# Patient Record
Sex: FEMALE | Race: ASIAN | NOT HISPANIC OR LATINO | URBAN - METROPOLITAN AREA
[De-identification: names, ages, dates, MRNs, and addresses within clinical notes are randomized per-mention and may not be internally consistent; named-entity substitution may affect disease eponyms.]

---

## 2018-03-25 ENCOUNTER — EMERGENCY (EMERGENCY)
Facility: HOSPITAL | Age: 28
LOS: 1 days | Discharge: HOME | End: 2018-03-25
Attending: EMERGENCY MEDICINE
Payer: COMMERCIAL

## 2018-03-25 VITALS
DIASTOLIC BLOOD PRESSURE: 83 MMHG | SYSTOLIC BLOOD PRESSURE: 122 MMHG | WEIGHT: 130.07 LBS | OXYGEN SATURATION: 100 % | RESPIRATION RATE: 16 BRPM | TEMPERATURE: 98 F | HEART RATE: 75 BPM

## 2018-03-25 PROCEDURE — 99283 EMERGENCY DEPT VISIT LOW MDM: CPT

## 2018-03-25 RX ORDER — DIPHENHYDRAMINE HCL 50 MG
25 CAPSULE ORAL ONCE
Qty: 0 | Refills: 0 | Status: COMPLETED | OUTPATIENT
Start: 2018-03-25 | End: 2018-03-25

## 2018-03-25 RX ORDER — IBUPROFEN 200 MG
400 TABLET ORAL ONCE
Qty: 0 | Refills: 0 | Status: COMPLETED | OUTPATIENT
Start: 2018-03-25 | End: 2018-03-25

## 2018-03-25 RX ORDER — HYDROCORTISONE 1 %
1 OINTMENT (GRAM) TOPICAL
Qty: 28 | Refills: 0 | OUTPATIENT
Start: 2018-03-25

## 2018-03-25 RX ADMIN — Medication 25 MILLIGRAM(S): at 23:25

## 2018-03-25 RX ADMIN — Medication 400 MILLIGRAM(S): at 23:25

## 2018-03-25 NOTE — ED PROVIDER NOTE - MEDICAL DECISION MAKING DETAILS
Facial dermatitis, likely allergic contact dermatitis. No other signs of systemic allergy. No signs of infection. Otherwise well appearing, hemodynamically stable. Given benadryl/ibuprofen. Discharged with rx for hydrocortisone cream and need for derm f/u.

## 2018-03-25 NOTE — ED PROVIDER NOTE - PHYSICAL EXAMINATION
Afebrile, hemodynamically stable  NAD, well appearing  Head NCAT  EOMI  MMM, no throat/lip/tongue swelling  No JVD  RRR, nml S1/S2, no m/r/g  Lungs CTAB, no w/r/r  AAO, CN's 3-12 grossly intact  PRITCHARD spontaneously, no leg cyanosis or edema  Skin warm, well perfused. Noted discrete nonerythematous bumps on face b/l

## 2018-03-25 NOTE — ED PROVIDER NOTE - OBJECTIVE STATEMENT
28 y/o F with no significant PMHx presents to ED c/o rash on face after facial wax and mask 2 days ago. Pt states that she has never had this reaction before. Pt states that she felt the burning and itching while getting waxed, and that her symptoms have gotten worse since. She tried taking Loratadine that was previously prescribed to her, to no relief of symptoms. Denies any difficulty breathing, throat swelling, or any other complaints. 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